# Patient Record
Sex: FEMALE | Race: BLACK OR AFRICAN AMERICAN
[De-identification: names, ages, dates, MRNs, and addresses within clinical notes are randomized per-mention and may not be internally consistent; named-entity substitution may affect disease eponyms.]

---

## 2018-02-10 ENCOUNTER — HOSPITAL ENCOUNTER (EMERGENCY)
Dept: HOSPITAL 17 - NEPD | Age: 58
Discharge: HOME | End: 2018-02-10
Payer: SELF-PAY

## 2018-02-10 VITALS
DIASTOLIC BLOOD PRESSURE: 75 MMHG | HEART RATE: 90 BPM | TEMPERATURE: 98.8 F | OXYGEN SATURATION: 98 % | RESPIRATION RATE: 14 BRPM | SYSTOLIC BLOOD PRESSURE: 138 MMHG

## 2018-02-10 VITALS — BODY MASS INDEX: 24.09 KG/M2 | HEIGHT: 66 IN | WEIGHT: 149.91 LBS

## 2018-02-10 DIAGNOSIS — K08.89: Primary | ICD-10-CM

## 2018-02-10 DIAGNOSIS — Z72.0: ICD-10-CM

## 2018-02-10 PROCEDURE — 99283 EMERGENCY DEPT VISIT LOW MDM: CPT

## 2018-02-10 NOTE — PD
HPI


Chief Complaint:  Oral / Dental Pain or Problem


Time Seen by Provider:  19:23


Travel History


International Travel<30 days:  No


Contact w/Intl Traveler<30days:  No


Traveled to known affect area:  No





History of Present Illness


HPI


c/o 3 days worth of dental pain, denies fever, nonrad, 7/10.  no alleviating 

factors , worse with chewing.  denies assoc factors such as fever, n/v/d/abdpain

/cp/backpain/





PFSH


Past Medical History


Diminished Hearing:  No


:  2


Para:  3





Social History


Alcohol Use:  No


Tobacco Use:  Yes (1 PACK PER 2 WEEKS)


Substance Use:  No





Allergies-Medications


(Allergen,Severity, Reaction):  


Coded Allergies:  


     No Known Allergies (Verified , 10/22/15)


Reported Meds & Prescriptions





Reported Meds & Active Scripts


Active


Robaxin-750 (Methocarbamol) 750 Mg Tab 750 Mg PO QID PRN


     FOR PAIN


Ibuprofen 600 Mg Tab 600 Mg PO Q8HR PRN








Review of Systems


General / Constitutional:  No: Fever


Eyes:  No: Visual changes


HENT:  Positive: Dental Difficulties


Cardiovascular:  No: Chest Pain or Discomfort


Respiratory:  No: Shortness of Breath


Gastrointestinal:  No: Abdominal Pain


Genitourinary:  No: Dysuria


Musculoskeletal:  No: Pain


Skin:  No Rash


Neurologic:  No: Weakness


Psychiatric:  No: Depression


Endocrine:  No: Polydipsia


Hematologic/Lymphatic:  No: Easy Bruising





Physical Exam


Narrative


GENERAL: 


SKIN: Warm and dry.


HEAD: Atraumatic. Normocephalic. 


EYES: Pupils equal and round. No scleral icterus. No injection or drainage. 


ENT: No nasal bleeding or discharge.  Mucous membranes pink and moist. 

gingivitis and edema localized at mandibular M2, no facial extension, no lad


NECK: Trachea midline. No JVD. 


CARDIOVASCULAR: Regular rate and rhythm.  


RESPIRATORY: No accessory muscle use. Clear to auscultation. Breath sounds 

equal bilaterally. 


GASTROINTESTINAL: Abdomen soft, non-tender, nondistended. 


MUSCULOSKELETAL: Extremities without clubbing, cyanosis, or edema. No obvious 

deformities. 


NEUROLOGICAL: Awake and alert. No obvious cranial nerve deficits.  Motor 

grossly within normal limits. Five out of 5 muscle strength in the arms and 

legs.  Normal speech.


PSYCHIATRIC: Appropriate mood and affect; insight and judgment normal.





Data


Data


Last Documented VS





Vital Signs








  Date Time  Temp Pulse Resp B/P (MAP) Pulse Ox O2 Delivery O2 Flow Rate FiO2


 


2/10/18 17:59 98.8 90 14 138/75 (96) 98   








Orders





 Orders


Amoxicillin (Trimox) (2/10/18 19:30)


Tramadol (Ultram) (2/10/18 19:30)








MDM


Medical Decision Making


Medical Screen Exam Complete:  Yes


Emergency Medical Condition:  No


Medical Record Reviewed:  Yes


Differential Diagnosis


gingivitis v dental abscess v facial extension


Narrative Course


noted dental infection without facial extension....will provide first dose and 

then d/c home





Diagnosis





 Primary Impression:  


 toothache


Patient Instructions:  General Instructions, Toothache (ED)


Scripts


Tramadol (Ultram) 50 Mg Tab


50 MG PO Q6H Y for PAIN, #12 TAB 0 Refills


   Prov: Faraz Herring MD         2/10/18 


Amoxicillin (Amoxicillin) 875 Mg Tab


875 MG PO BID for Infection for 7 Days, #14 TAB 0 Refills


   Prov: Faraz Herring MD         2/10/18


Disposition:  01 DISCHARGE HOME


Condition:  Stable











Faraz Herring MD Feb 10, 2018 19:26